# Patient Record
Sex: FEMALE | URBAN - METROPOLITAN AREA
[De-identification: names, ages, dates, MRNs, and addresses within clinical notes are randomized per-mention and may not be internally consistent; named-entity substitution may affect disease eponyms.]

---

## 2021-12-17 ENCOUNTER — PREPPED CHART (OUTPATIENT)
Dept: URBAN - METROPOLITAN AREA CLINIC 74 | Facility: CLINIC | Age: 56
End: 2021-12-17

## 2021-12-17 PROBLEM — H34.8310 BRANCH RETINAL VEIN OCCLUSION WITH MACULAR EDEMA: Noted: 2021-12-17

## 2021-12-17 PROBLEM — H43.813 POSTERIOR VITREOUS DETACHMENT: Noted: 2021-12-17

## 2021-12-17 PROBLEM — H53.002 AMBYOPIA: Noted: 2021-12-17

## 2021-12-17 PROBLEM — H43.811: Noted: 2021-12-17

## 2022-04-23 ASSESSMENT — TONOMETRY
OD_IOP_MMHG: 14
OS_IOP_MMHG: 13

## 2022-04-23 ASSESSMENT — VISUAL ACUITY
OD_PH: 20/25-2
OD_SC: 20/40-2
OS_SC: 20/20-2

## 2022-06-03 ENCOUNTER — FOLLOW UP (OUTPATIENT)
Dept: URBAN - METROPOLITAN AREA CLINIC 74 | Facility: CLINIC | Age: 57
End: 2022-06-03

## 2022-06-03 DIAGNOSIS — H34.8310: ICD-10-CM

## 2022-06-03 DIAGNOSIS — H53.002: ICD-10-CM

## 2022-06-03 DIAGNOSIS — H43.813: ICD-10-CM

## 2022-06-03 PROCEDURE — 99214 OFFICE O/P EST MOD 30 MIN: CPT | Mod: 25

## 2022-06-03 PROCEDURE — 67028 INJECTION EYE DRUG: CPT

## 2022-06-03 PROCEDURE — 92202 OPSCPY EXTND ON/MAC DRAW: CPT

## 2022-06-03 PROCEDURE — 92134 CPTRZ OPH DX IMG PST SGM RTA: CPT

## 2022-06-03 PROCEDURE — PF5 LUCENTIS PREFILLED SYRINGE

## 2022-06-03 ASSESSMENT — VISUAL ACUITY
OD_PH: 20/50-1
OD_CC: 20/60-1
OS_CC: 20/30-1

## 2022-06-03 ASSESSMENT — TONOMETRY
OS_IOP_MMHG: 14
OD_IOP_MMHG: 15

## 2022-07-14 ENCOUNTER — FOLLOW UP (OUTPATIENT)
Dept: URBAN - METROPOLITAN AREA CLINIC 74 | Facility: CLINIC | Age: 57
End: 2022-07-14

## 2022-07-14 DIAGNOSIS — H43.813: ICD-10-CM

## 2022-07-14 DIAGNOSIS — H34.8310: ICD-10-CM

## 2022-07-14 DIAGNOSIS — H53.002: ICD-10-CM

## 2022-07-14 PROCEDURE — PF5 LUCENTIS PREFILLED SYRINGE

## 2022-07-14 PROCEDURE — 92134 CPTRZ OPH DX IMG PST SGM RTA: CPT

## 2022-07-14 PROCEDURE — 67028 INJECTION EYE DRUG: CPT

## 2022-07-14 PROCEDURE — 92014 COMPRE OPH EXAM EST PT 1/>: CPT | Mod: 25

## 2022-07-14 PROCEDURE — 92202 OPSCPY EXTND ON/MAC DRAW: CPT

## 2022-07-14 ASSESSMENT — TONOMETRY
OD_IOP_MMHG: 11
OS_IOP_MMHG: 14

## 2022-07-14 ASSESSMENT — VISUAL ACUITY
OD_CC: 20/50-2
OS_CC: 20/30

## 2022-08-31 ENCOUNTER — FOLLOW UP (OUTPATIENT)
Dept: URBAN - METROPOLITAN AREA CLINIC 74 | Facility: CLINIC | Age: 57
End: 2022-08-31

## 2022-08-31 DIAGNOSIS — H34.8310: ICD-10-CM

## 2022-08-31 DIAGNOSIS — H53.002: ICD-10-CM

## 2022-08-31 DIAGNOSIS — H43.813: ICD-10-CM

## 2022-08-31 PROCEDURE — 92014 COMPRE OPH EXAM EST PT 1/>: CPT | Mod: 25

## 2022-08-31 PROCEDURE — 67028 INJECTION EYE DRUG: CPT

## 2022-08-31 PROCEDURE — 92202 OPSCPY EXTND ON/MAC DRAW: CPT

## 2022-08-31 PROCEDURE — 92134 CPTRZ OPH DX IMG PST SGM RTA: CPT

## 2022-08-31 PROCEDURE — PF5 LUCENTIS PREFILLED SYRINGE

## 2022-08-31 ASSESSMENT — VISUAL ACUITY
OD_CC: 20/70
OD_PH: 20/60
OS_CC: 20/30

## 2022-08-31 ASSESSMENT — TONOMETRY
OS_IOP_MMHG: 11
OD_IOP_MMHG: 14

## 2022-10-11 ENCOUNTER — FOLLOW UP (OUTPATIENT)
Dept: URBAN - METROPOLITAN AREA CLINIC 18 | Facility: CLINIC | Age: 57
End: 2022-10-11

## 2022-10-11 DIAGNOSIS — H53.002: ICD-10-CM

## 2022-10-11 DIAGNOSIS — H34.8310: ICD-10-CM

## 2022-10-11 DIAGNOSIS — H43.813: ICD-10-CM

## 2022-10-11 PROCEDURE — 92202 OPSCPY EXTND ON/MAC DRAW: CPT | Mod: 59

## 2022-10-11 PROCEDURE — 67028 INJECTION EYE DRUG: CPT

## 2022-10-11 PROCEDURE — 92134 CPTRZ OPH DX IMG PST SGM RTA: CPT

## 2022-10-11 PROCEDURE — PF5 LUCENTIS PREFILLED SYRINGE

## 2022-10-11 PROCEDURE — 92014 COMPRE OPH EXAM EST PT 1/>: CPT | Mod: 25

## 2022-10-11 ASSESSMENT — TONOMETRY
OS_IOP_MMHG: 14
OD_IOP_MMHG: 16

## 2022-10-11 ASSESSMENT — VISUAL ACUITY
OS_SC: 20/30
OD_SC: 20/50-1

## 2022-11-16 ENCOUNTER — FOLLOW UP (OUTPATIENT)
Dept: URBAN - METROPOLITAN AREA CLINIC 74 | Facility: CLINIC | Age: 57
End: 2022-11-16

## 2022-11-16 DIAGNOSIS — H34.8310: ICD-10-CM

## 2022-11-16 DIAGNOSIS — H53.002: ICD-10-CM

## 2022-11-16 DIAGNOSIS — H43.813: ICD-10-CM

## 2022-11-16 PROCEDURE — 92202 OPSCPY EXTND ON/MAC DRAW: CPT | Mod: NC

## 2022-11-16 PROCEDURE — 67028 INJECTION EYE DRUG: CPT

## 2022-11-16 PROCEDURE — 92134 CPTRZ OPH DX IMG PST SGM RTA: CPT

## 2022-11-16 PROCEDURE — PF5 LUCENTIS PREFILLED SYRINGE

## 2022-11-16 PROCEDURE — 92014 COMPRE OPH EXAM EST PT 1/>: CPT | Mod: 25

## 2022-11-16 ASSESSMENT — TONOMETRY
OS_IOP_MMHG: 15
OD_IOP_MMHG: 13

## 2022-11-16 ASSESSMENT — VISUAL ACUITY
OS_CC: 20/30-2
OD_CC: 20/50+2

## 2022-12-19 ENCOUNTER — FOLLOW UP (OUTPATIENT)
Dept: URBAN - METROPOLITAN AREA CLINIC 18 | Facility: CLINIC | Age: 57
End: 2022-12-19

## 2022-12-19 DIAGNOSIS — H34.8310: ICD-10-CM

## 2022-12-19 DIAGNOSIS — H43.813: ICD-10-CM

## 2022-12-19 DIAGNOSIS — H53.002: ICD-10-CM

## 2022-12-19 PROCEDURE — 92202 OPSCPY EXTND ON/MAC DRAW: CPT | Mod: NC

## 2022-12-19 PROCEDURE — PF5 LUCENTIS PREFILLED SYRINGE

## 2022-12-19 PROCEDURE — 92014 COMPRE OPH EXAM EST PT 1/>: CPT | Mod: 25

## 2022-12-19 PROCEDURE — 92134 CPTRZ OPH DX IMG PST SGM RTA: CPT

## 2022-12-19 PROCEDURE — 67028 INJECTION EYE DRUG: CPT

## 2022-12-19 ASSESSMENT — VISUAL ACUITY
OD_CC: 20/40-2
OS_CC: 20/30
OD_PH: 20/30-1
OS_PH: 20/25-2

## 2022-12-19 ASSESSMENT — TONOMETRY
OD_IOP_MMHG: 14
OS_IOP_MMHG: 18

## 2023-01-26 ENCOUNTER — FOLLOW UP (OUTPATIENT)
Dept: URBAN - METROPOLITAN AREA CLINIC 74 | Facility: CLINIC | Age: 58
End: 2023-01-26

## 2023-01-26 DIAGNOSIS — H43.813: ICD-10-CM

## 2023-01-26 DIAGNOSIS — H53.002: ICD-10-CM

## 2023-01-26 DIAGNOSIS — H34.8310: ICD-10-CM

## 2023-01-26 PROCEDURE — 92014 COMPRE OPH EXAM EST PT 1/>: CPT | Mod: 25

## 2023-01-26 PROCEDURE — 67028 INJECTION EYE DRUG: CPT

## 2023-01-26 PROCEDURE — 92134 CPTRZ OPH DX IMG PST SGM RTA: CPT

## 2023-01-26 PROCEDURE — PF5 LUCENTIS PREFILLED SYRINGE

## 2023-01-26 PROCEDURE — 92202 OPSCPY EXTND ON/MAC DRAW: CPT | Mod: NC

## 2023-01-26 ASSESSMENT — VISUAL ACUITY
OD_PH: 20/60
OS_CC: 20/30-2
OD_CC: 20/70+2

## 2023-01-26 ASSESSMENT — TONOMETRY
OD_IOP_MMHG: 15
OS_IOP_MMHG: 18

## 2023-03-01 ENCOUNTER — PROBLEM (OUTPATIENT)
Dept: URBAN - METROPOLITAN AREA CLINIC 74 | Facility: CLINIC | Age: 58
End: 2023-03-01

## 2023-03-01 DIAGNOSIS — H43.813: ICD-10-CM

## 2023-03-01 DIAGNOSIS — J30.1: ICD-10-CM

## 2023-03-01 DIAGNOSIS — H34.8310: ICD-10-CM

## 2023-03-01 DIAGNOSIS — H10.13: ICD-10-CM

## 2023-03-01 DIAGNOSIS — H53.002: ICD-10-CM

## 2023-03-01 PROCEDURE — 92012 INTRM OPH EXAM EST PATIENT: CPT

## 2023-03-01 PROCEDURE — 92134 CPTRZ OPH DX IMG PST SGM RTA: CPT

## 2023-03-01 ASSESSMENT — VISUAL ACUITY
OS_SC: 20/30
OD_SC: 20/50

## 2023-03-15 ENCOUNTER — PROBLEM (OUTPATIENT)
Dept: URBAN - METROPOLITAN AREA CLINIC 74 | Facility: CLINIC | Age: 58
End: 2023-03-15

## 2023-03-15 DIAGNOSIS — H53.002: ICD-10-CM

## 2023-03-15 DIAGNOSIS — H34.8310: ICD-10-CM

## 2023-03-15 DIAGNOSIS — H43.813: ICD-10-CM

## 2023-03-15 PROCEDURE — PF5 LUCENTIS PREFILLED SYRINGE

## 2023-03-15 PROCEDURE — 92134 CPTRZ OPH DX IMG PST SGM RTA: CPT

## 2023-03-15 PROCEDURE — 92014 COMPRE OPH EXAM EST PT 1/>: CPT | Mod: 25

## 2023-03-15 PROCEDURE — 92202 OPSCPY EXTND ON/MAC DRAW: CPT | Mod: NC

## 2023-03-15 PROCEDURE — 67028 INJECTION EYE DRUG: CPT

## 2023-03-15 ASSESSMENT — VISUAL ACUITY
OS_SC: 20/40+2
OD_PH: 20/50
OD_SC: 20/60-2

## 2023-03-15 ASSESSMENT — TONOMETRY
OD_IOP_MMHG: 14
OS_IOP_MMHG: 16

## 2023-04-26 ENCOUNTER — FOLLOW UP (OUTPATIENT)
Dept: URBAN - METROPOLITAN AREA CLINIC 74 | Facility: CLINIC | Age: 58
End: 2023-04-26

## 2023-04-26 DIAGNOSIS — H34.8310: ICD-10-CM

## 2023-04-26 DIAGNOSIS — H43.813: ICD-10-CM

## 2023-04-26 DIAGNOSIS — H53.002: ICD-10-CM

## 2023-04-26 PROCEDURE — 92014 COMPRE OPH EXAM EST PT 1/>: CPT | Mod: 25

## 2023-04-26 PROCEDURE — PF5 LUCENTIS PREFILLED SYRINGE

## 2023-04-26 PROCEDURE — 92201 OPSCPY EXTND RTA DRAW UNI/BI: CPT | Mod: 59

## 2023-04-26 PROCEDURE — 67028 INJECTION EYE DRUG: CPT

## 2023-04-26 PROCEDURE — 92134 CPTRZ OPH DX IMG PST SGM RTA: CPT

## 2023-04-26 ASSESSMENT — VISUAL ACUITY
OS_PH: 20/25
OD_SC: 20/50
OD_PH: 20/40-2
OS_SC: 20/30

## 2023-04-26 ASSESSMENT — TONOMETRY
OS_IOP_MMHG: 15
OD_IOP_MMHG: 14

## 2023-06-06 ENCOUNTER — FOLLOW UP (OUTPATIENT)
Dept: URBAN - METROPOLITAN AREA CLINIC 18 | Facility: CLINIC | Age: 58
End: 2023-06-06

## 2023-06-06 DIAGNOSIS — H34.8310: ICD-10-CM

## 2023-06-06 DIAGNOSIS — H53.002: ICD-10-CM

## 2023-06-06 DIAGNOSIS — H43.813: ICD-10-CM

## 2023-06-06 PROCEDURE — 67028 INJECTION EYE DRUG: CPT

## 2023-06-06 PROCEDURE — 92014 COMPRE OPH EXAM EST PT 1/>: CPT | Mod: 25

## 2023-06-06 PROCEDURE — 92202 OPSCPY EXTND ON/MAC DRAW: CPT | Mod: NC

## 2023-06-06 PROCEDURE — PFS EYLEA PFS

## 2023-06-06 PROCEDURE — 92134 CPTRZ OPH DX IMG PST SGM RTA: CPT

## 2023-06-06 ASSESSMENT — VISUAL ACUITY
OS_CC: 20/30-1
OD_CC: 20/40

## 2023-06-06 ASSESSMENT — TONOMETRY
OS_IOP_MMHG: 11
OD_IOP_MMHG: 8

## 2023-06-28 ENCOUNTER — PROBLEM (OUTPATIENT)
Dept: URBAN - METROPOLITAN AREA CLINIC 74 | Facility: CLINIC | Age: 58
End: 2023-06-28

## 2023-06-28 DIAGNOSIS — H53.002: ICD-10-CM

## 2023-06-28 DIAGNOSIS — H43.811: ICD-10-CM

## 2023-06-28 DIAGNOSIS — H34.8310: ICD-10-CM

## 2023-06-28 PROCEDURE — 92134 CPTRZ OPH DX IMG PST SGM RTA: CPT

## 2023-06-28 PROCEDURE — 92201 OPSCPY EXTND RTA DRAW UNI/BI: CPT

## 2023-06-28 PROCEDURE — 92014 COMPRE OPH EXAM EST PT 1/>: CPT

## 2023-06-28 ASSESSMENT — TONOMETRY
OS_IOP_MMHG: 12
OD_IOP_MMHG: 15

## 2023-06-28 ASSESSMENT — VISUAL ACUITY
OD_CC: 20/40-2
OS_CC: 20/30

## 2023-07-06 ENCOUNTER — FOLLOW UP (OUTPATIENT)
Dept: URBAN - METROPOLITAN AREA CLINIC 74 | Facility: CLINIC | Age: 58
End: 2023-07-06

## 2023-07-06 DIAGNOSIS — H43.813: ICD-10-CM

## 2023-07-06 DIAGNOSIS — H34.8310: ICD-10-CM

## 2023-07-06 PROCEDURE — 67028 INJECTION EYE DRUG: CPT

## 2023-07-06 PROCEDURE — 92134 CPTRZ OPH DX IMG PST SGM RTA: CPT

## 2023-07-06 PROCEDURE — 92202 OPSCPY EXTND ON/MAC DRAW: CPT | Mod: 59

## 2023-07-06 PROCEDURE — 92014 COMPRE OPH EXAM EST PT 1/>: CPT | Mod: 25

## 2023-07-06 PROCEDURE — PFS EYLEA PFS: Mod: JZ

## 2023-07-06 ASSESSMENT — VISUAL ACUITY
OD_SC: 20/30-2
OS_SC: 20/20

## 2023-07-06 ASSESSMENT — TONOMETRY
OD_IOP_MMHG: 6
OS_IOP_MMHG: 7

## 2023-08-10 ENCOUNTER — FOLLOW UP (OUTPATIENT)
Dept: URBAN - METROPOLITAN AREA CLINIC 74 | Facility: CLINIC | Age: 58
End: 2023-08-10

## 2023-08-10 DIAGNOSIS — H33.321: ICD-10-CM

## 2023-08-10 DIAGNOSIS — H34.8310: ICD-10-CM

## 2023-08-10 PROCEDURE — 92134 CPTRZ OPH DX IMG PST SGM RTA: CPT

## 2023-08-10 PROCEDURE — 92201 OPSCPY EXTND RTA DRAW UNI/BI: CPT

## 2023-08-10 PROCEDURE — 92014 COMPRE OPH EXAM EST PT 1/>: CPT

## 2023-08-10 ASSESSMENT — TONOMETRY
OS_IOP_MMHG: 11
OD_IOP_MMHG: 10

## 2023-08-10 ASSESSMENT — VISUAL ACUITY
OS_SC: 20/20
OD_SC: 20/30-3

## 2023-08-30 ENCOUNTER — FOLLOW UP (OUTPATIENT)
Dept: URBAN - METROPOLITAN AREA CLINIC 74 | Facility: CLINIC | Age: 58
End: 2023-08-30

## 2023-08-30 DIAGNOSIS — H35.412: ICD-10-CM

## 2023-08-30 DIAGNOSIS — H33.321: ICD-10-CM

## 2023-08-30 DIAGNOSIS — H43.813: ICD-10-CM

## 2023-08-30 DIAGNOSIS — H34.8310: ICD-10-CM

## 2023-08-30 PROCEDURE — 92201 OPSCPY EXTND RTA DRAW UNI/BI: CPT | Mod: 59

## 2023-08-30 PROCEDURE — 92134 CPTRZ OPH DX IMG PST SGM RTA: CPT

## 2023-08-30 PROCEDURE — 67028 INJECTION EYE DRUG: CPT

## 2023-08-30 PROCEDURE — PFS EYLEA PFS: Mod: JZ

## 2023-08-30 PROCEDURE — 92014 COMPRE OPH EXAM EST PT 1/>: CPT | Mod: 25

## 2023-08-30 ASSESSMENT — TONOMETRY
OS_IOP_MMHG: 13
OD_IOP_MMHG: 14

## 2023-08-30 ASSESSMENT — VISUAL ACUITY
OD_SC: 20/30+2
OD_PH: 20/25-
OS_SC: 20/20-2

## 2023-10-25 ENCOUNTER — FOLLOW UP (OUTPATIENT)
Dept: URBAN - METROPOLITAN AREA CLINIC 74 | Facility: CLINIC | Age: 58
End: 2023-10-25

## 2023-10-25 DIAGNOSIS — H43.813: ICD-10-CM

## 2023-10-25 DIAGNOSIS — H33.321: ICD-10-CM

## 2023-10-25 DIAGNOSIS — H35.412: ICD-10-CM

## 2023-10-25 DIAGNOSIS — H34.8310: ICD-10-CM

## 2023-10-25 PROCEDURE — 67028 INJECTION EYE DRUG: CPT

## 2023-10-25 PROCEDURE — PFS EYLEA PFS: Mod: JZ

## 2023-10-25 PROCEDURE — 92201 OPSCPY EXTND RTA DRAW UNI/BI: CPT | Mod: 59

## 2023-10-25 PROCEDURE — 92134 CPTRZ OPH DX IMG PST SGM RTA: CPT

## 2023-10-25 PROCEDURE — 92014 COMPRE OPH EXAM EST PT 1/>: CPT | Mod: 25

## 2023-10-25 ASSESSMENT — TONOMETRY
OD_IOP_MMHG: 21
OS_IOP_MMHG: 16

## 2023-10-25 ASSESSMENT — VISUAL ACUITY
OD_PH: 20/25-1
OD_CC: 20/30-1
OS_CC: 20/25+2

## 2024-01-17 ENCOUNTER — FOLLOW UP (OUTPATIENT)
Dept: URBAN - METROPOLITAN AREA CLINIC 74 | Facility: CLINIC | Age: 59
End: 2024-01-17

## 2024-01-17 DIAGNOSIS — H34.8310: ICD-10-CM

## 2024-01-17 DIAGNOSIS — H33.321: ICD-10-CM

## 2024-01-17 DIAGNOSIS — H43.813: ICD-10-CM

## 2024-01-17 DIAGNOSIS — H35.412: ICD-10-CM

## 2024-01-17 PROCEDURE — PFS EYLEA PFS: Mod: JZ

## 2024-01-17 PROCEDURE — 92014 COMPRE OPH EXAM EST PT 1/>: CPT | Mod: 25

## 2024-01-17 PROCEDURE — 67028 INJECTION EYE DRUG: CPT

## 2024-01-17 PROCEDURE — 92134 CPTRZ OPH DX IMG PST SGM RTA: CPT

## 2024-01-17 PROCEDURE — 92201 OPSCPY EXTND RTA DRAW UNI/BI: CPT | Mod: 59

## 2024-01-17 ASSESSMENT — VISUAL ACUITY
OD_PH: 20/40
OS_SC: 20/25+1
OD_SC: 20/50-2

## 2024-01-17 ASSESSMENT — TONOMETRY
OS_IOP_MMHG: 13
OD_IOP_MMHG: 14

## 2024-03-20 ENCOUNTER — FOLLOW UP (OUTPATIENT)
Dept: URBAN - METROPOLITAN AREA CLINIC 74 | Facility: CLINIC | Age: 59
End: 2024-03-20

## 2024-03-20 DIAGNOSIS — H33.321: ICD-10-CM

## 2024-03-20 DIAGNOSIS — H34.8310: ICD-10-CM

## 2024-03-20 DIAGNOSIS — H35.412: ICD-10-CM

## 2024-03-20 DIAGNOSIS — H43.813: ICD-10-CM

## 2024-03-20 PROCEDURE — 92134 CPTRZ OPH DX IMG PST SGM RTA: CPT

## 2024-03-20 PROCEDURE — 92202 OPSCPY EXTND ON/MAC DRAW: CPT | Mod: 59

## 2024-03-20 PROCEDURE — 92014 COMPRE OPH EXAM EST PT 1/>: CPT | Mod: 25

## 2024-03-20 PROCEDURE — 67028 INJECTION EYE DRUG: CPT

## 2024-03-20 PROCEDURE — PFS EYLEA PFS: Mod: JZ

## 2024-03-20 ASSESSMENT — TONOMETRY
OD_IOP_MMHG: 15
OS_IOP_MMHG: 16

## 2024-03-20 ASSESSMENT — VISUAL ACUITY
OD_SC: 20/40-2
OS_SC: 20/25
OD_PH: 20/25-2

## 2024-05-14 ENCOUNTER — APPOINTMENT (RX ONLY)
Dept: URBAN - METROPOLITAN AREA CLINIC 4 | Facility: CLINIC | Age: 59
Setting detail: DERMATOLOGY
End: 2024-05-14

## 2024-05-14 DIAGNOSIS — L91.8 OTHER HYPERTROPHIC DISORDERS OF THE SKIN: ICD-10-CM

## 2024-05-14 DIAGNOSIS — L71.8 OTHER ROSACEA: ICD-10-CM

## 2024-05-14 DIAGNOSIS — L81.4 OTHER MELANIN HYPERPIGMENTATION: ICD-10-CM

## 2024-05-14 DIAGNOSIS — D22 MELANOCYTIC NEVI: ICD-10-CM

## 2024-05-14 DIAGNOSIS — L82.1 OTHER SEBORRHEIC KERATOSIS: ICD-10-CM

## 2024-05-14 DIAGNOSIS — B37.2 CANDIDIASIS OF SKIN AND NAIL: ICD-10-CM

## 2024-05-14 DIAGNOSIS — L85.8 OTHER SPECIFIED EPIDERMAL THICKENING: ICD-10-CM

## 2024-05-14 PROBLEM — D22.62 MELANOCYTIC NEVI OF LEFT UPPER LIMB, INCLUDING SHOULDER: Status: ACTIVE | Noted: 2024-05-14

## 2024-05-14 PROBLEM — D22.72 MELANOCYTIC NEVI OF LEFT LOWER LIMB, INCLUDING HIP: Status: ACTIVE | Noted: 2024-05-14

## 2024-05-14 PROBLEM — D22.5 MELANOCYTIC NEVI OF TRUNK: Status: ACTIVE | Noted: 2024-05-14

## 2024-05-14 PROBLEM — D22.71 MELANOCYTIC NEVI OF RIGHT LOWER LIMB, INCLUDING HIP: Status: ACTIVE | Noted: 2024-05-14

## 2024-05-14 PROBLEM — D22.61 MELANOCYTIC NEVI OF RIGHT UPPER LIMB, INCLUDING SHOULDER: Status: ACTIVE | Noted: 2024-05-14

## 2024-05-14 PROBLEM — D22.4 MELANOCYTIC NEVI OF SCALP AND NECK: Status: ACTIVE | Noted: 2024-05-14

## 2024-05-14 PROCEDURE — ? COUNSELING

## 2024-05-14 PROCEDURE — ? ADDITIONAL NOTES

## 2024-05-14 PROCEDURE — ? DIAGNOSIS COMMENT

## 2024-05-14 PROCEDURE — 99203 OFFICE O/P NEW LOW 30 MIN: CPT

## 2024-05-14 PROCEDURE — ? OTC TREATMENT REGIMEN

## 2024-05-14 PROCEDURE — ? PRESCRIPTION

## 2024-05-14 PROCEDURE — ? TREATMENT REGIMEN

## 2024-05-14 PROCEDURE — ? POINTED OUT BY PATIENT

## 2024-05-14 RX ORDER — AZELAIC ACID 0.15 G/G
GEL TOPICAL
Qty: 50 | Refills: 3 | Status: ERX | COMMUNITY
Start: 2024-05-14

## 2024-05-14 RX ADMIN — AZELAIC ACID: 0.15 GEL TOPICAL at 00:00

## 2024-05-14 ASSESSMENT — LOCATION SIMPLE DESCRIPTION DERM
LOCATION SIMPLE: LEFT THIGH
LOCATION SIMPLE: LEFT CHEEK
LOCATION SIMPLE: LEFT UPPER ARM
LOCATION SIMPLE: RIGHT FOOT
LOCATION SIMPLE: RIGHT LOWER BACK
LOCATION SIMPLE: RIGHT PRETIBIAL REGION
LOCATION SIMPLE: RIGHT UPPER ARM
LOCATION SIMPLE: LEFT FOREARM
LOCATION SIMPLE: LEFT FOOT
LOCATION SIMPLE: LEFT LATERAL INFERIOR PRETARSAL REGION
LOCATION SIMPLE: RIGHT FOREARM
LOCATION SIMPLE: ABDOMEN
LOCATION SIMPLE: RIGHT CHEEK
LOCATION SIMPLE: SCALP
LOCATION SIMPLE: CHEST
LOCATION SIMPLE: LEFT BREAST
LOCATION SIMPLE: RIGHT THIGH
LOCATION SIMPLE: LEFT PRETIBIAL REGION
LOCATION SIMPLE: GROIN

## 2024-05-14 ASSESSMENT — LOCATION DETAILED DESCRIPTION DERM
LOCATION DETAILED: RIGHT MEDIAL SUPERIOR CHEST
LOCATION DETAILED: RIGHT SUPRAPUBIC SKIN
LOCATION DETAILED: LEFT MEDIAL BREAST 6-7:00 REGION
LOCATION DETAILED: EPIGASTRIC SKIN
LOCATION DETAILED: LEFT LATERAL INFERIOR PRETARSAL REGION
LOCATION DETAILED: LEFT LATERAL HEEL
LOCATION DETAILED: LEFT INFERIOR MEDIAL MALAR CHEEK
LOCATION DETAILED: LEFT ANTERIOR PROXIMAL THIGH
LOCATION DETAILED: RIGHT ANTERIOR DISTAL UPPER ARM
LOCATION DETAILED: RIGHT ANTERIOR MEDIAL PROXIMAL THIGH
LOCATION DETAILED: RIGHT ANTERIOR PROXIMAL THIGH
LOCATION DETAILED: LEFT INFERIOR CENTRAL MALAR CHEEK
LOCATION DETAILED: RIGHT ANTERIOR PROXIMAL UPPER ARM
LOCATION DETAILED: LEFT VENTRAL PROXIMAL FOREARM
LOCATION DETAILED: RIGHT ANTERIOR DISTAL THIGH
LOCATION DETAILED: LEFT ANTERIOR DISTAL UPPER ARM
LOCATION DETAILED: LEFT ANTERIOR LATERAL DISTAL THIGH
LOCATION DETAILED: RIGHT LATERAL HEEL
LOCATION DETAILED: LEFT PROXIMAL PRETIBIAL REGION
LOCATION DETAILED: LEFT CENTRAL PARIETAL SCALP
LOCATION DETAILED: RIGHT SUPERIOR MEDIAL MIDBACK
LOCATION DETAILED: RIGHT CENTRAL MALAR CHEEK
LOCATION DETAILED: RIGHT PROXIMAL PRETIBIAL REGION
LOCATION DETAILED: RIGHT VENTRAL PROXIMAL FOREARM
LOCATION DETAILED: LEFT ANTERIOR PROXIMAL UPPER ARM
LOCATION DETAILED: RIGHT LATERAL ABDOMEN

## 2024-05-14 ASSESSMENT — LOCATION ZONE DERM
LOCATION ZONE: FACE
LOCATION ZONE: LEG
LOCATION ZONE: EYELID
LOCATION ZONE: ARM
LOCATION ZONE: TRUNK
LOCATION ZONE: FEET
LOCATION ZONE: SCALP

## 2024-05-14 NOTE — PROCEDURE: ADDITIONAL NOTES
Render Risk Assessment In Note?: no
Detail Level: Simple
Additional Notes: Informed pt if she would like removal it would be cosmetic and needs to be a seperate appt

## 2024-05-14 NOTE — HPI: EVALUATION OF SKIN LESION(S)
What Type Of Note Output Would You Prefer (Optional)?: Bullet Format
Hpi Title: Evaluation of Skin Lesions
Additional History: \\nReports intermittent and recurrent red rash on lower abdomen and inframammary creases. Resolves with clotrimazole prn.

## 2024-05-14 NOTE — PROCEDURE: DIAGNOSIS COMMENT
Render Risk Assessment In Note?: no
Detail Level: Simple
Comment: Advised pt to f/up with  for removal

## 2024-05-14 NOTE — PROCEDURE: OTC TREATMENT REGIMEN
Patient Specific Otc Recommendations (Will Not Stick From Patient To Patient): Clotrimazole cream BID for 2 weeks prn
Detail Level: Zone

## 2024-06-19 ENCOUNTER — FOLLOW UP (OUTPATIENT)
Dept: URBAN - METROPOLITAN AREA CLINIC 74 | Facility: CLINIC | Age: 59
End: 2024-06-19

## 2024-06-19 DIAGNOSIS — H35.412: ICD-10-CM

## 2024-06-19 DIAGNOSIS — H33.321: ICD-10-CM

## 2024-06-19 DIAGNOSIS — H43.813: ICD-10-CM

## 2024-06-19 DIAGNOSIS — H34.8310: ICD-10-CM

## 2024-06-19 PROCEDURE — 92014 COMPRE OPH EXAM EST PT 1/>: CPT | Mod: 25

## 2024-06-19 PROCEDURE — PFS EYLEA PFS: Mod: JZ

## 2024-06-19 PROCEDURE — 92202 OPSCPY EXTND ON/MAC DRAW: CPT | Mod: 59

## 2024-06-19 PROCEDURE — 67028 INJECTION EYE DRUG: CPT

## 2024-06-19 PROCEDURE — 92134 CPTRZ OPH DX IMG PST SGM RTA: CPT

## 2024-06-19 ASSESSMENT — TONOMETRY
OD_IOP_MMHG: 15
OS_IOP_MMHG: 17

## 2024-06-19 ASSESSMENT — VISUAL ACUITY
OD_SC: 20/40-2
OS_SC: 20/20-

## 2024-08-28 ENCOUNTER — FOLLOW UP (OUTPATIENT)
Dept: URBAN - METROPOLITAN AREA CLINIC 74 | Facility: CLINIC | Age: 59
End: 2024-08-28

## 2024-08-28 DIAGNOSIS — H33.321: ICD-10-CM

## 2024-08-28 DIAGNOSIS — H35.412: ICD-10-CM

## 2024-08-28 DIAGNOSIS — H43.813: ICD-10-CM

## 2024-08-28 DIAGNOSIS — H34.8310: ICD-10-CM

## 2024-08-28 PROCEDURE — 92202 OPSCPY EXTND ON/MAC DRAW: CPT | Mod: 59

## 2024-08-28 PROCEDURE — 92014 COMPRE OPH EXAM EST PT 1/>: CPT | Mod: 25

## 2024-08-28 PROCEDURE — PFS EYLEA PFS: Mod: JZ

## 2024-08-28 PROCEDURE — 67028 INJECTION EYE DRUG: CPT

## 2024-08-28 PROCEDURE — 92134 CPTRZ OPH DX IMG PST SGM RTA: CPT

## 2024-08-28 ASSESSMENT — VISUAL ACUITY
OD_SC: 20/40-1
OS_SC: 20/20

## 2024-08-28 ASSESSMENT — TONOMETRY
OD_IOP_MMHG: 9
OS_IOP_MMHG: 9

## 2024-10-16 ENCOUNTER — FOLLOW UP (OUTPATIENT)
Dept: URBAN - METROPOLITAN AREA CLINIC 74 | Facility: CLINIC | Age: 59
End: 2024-10-16

## 2024-10-16 DIAGNOSIS — H33.321: ICD-10-CM

## 2024-10-16 DIAGNOSIS — H34.8310: ICD-10-CM

## 2024-10-16 DIAGNOSIS — H43.813: ICD-10-CM

## 2024-10-16 DIAGNOSIS — H35.412: ICD-10-CM

## 2024-10-16 PROCEDURE — 92134 CPTRZ OPH DX IMG PST SGM RTA: CPT

## 2024-10-16 PROCEDURE — 92014 COMPRE OPH EXAM EST PT 1/>: CPT | Mod: 25

## 2024-10-16 PROCEDURE — 67028 INJECTION EYE DRUG: CPT

## 2024-10-16 PROCEDURE — 92202 OPSCPY EXTND ON/MAC DRAW: CPT | Mod: 59

## 2024-10-16 PROCEDURE — PFS EYLEA PFS: Mod: JZ

## 2024-10-16 ASSESSMENT — TONOMETRY
OD_IOP_MMHG: 12
OS_IOP_MMHG: 11

## 2024-10-16 ASSESSMENT — VISUAL ACUITY
OS_SC: 20/20-2
OD_SC: 20/30+2

## 2024-11-20 ENCOUNTER — PROBLEM (OUTPATIENT)
Dept: URBAN - METROPOLITAN AREA CLINIC 74 | Facility: CLINIC | Age: 59
End: 2024-11-20

## 2024-11-20 DIAGNOSIS — H35.62: ICD-10-CM

## 2024-11-20 DIAGNOSIS — H43.813: ICD-10-CM

## 2024-11-20 DIAGNOSIS — H53.8: ICD-10-CM

## 2024-11-20 DIAGNOSIS — H33.321: ICD-10-CM

## 2024-11-20 DIAGNOSIS — H34.8310: ICD-10-CM

## 2024-11-20 DIAGNOSIS — H25.13: ICD-10-CM

## 2024-11-20 DIAGNOSIS — H35.412: ICD-10-CM

## 2024-11-20 PROCEDURE — 92202 OPSCPY EXTND ON/MAC DRAW: CPT

## 2024-11-20 PROCEDURE — 92134 CPTRZ OPH DX IMG PST SGM RTA: CPT

## 2024-11-20 PROCEDURE — 92014 COMPRE OPH EXAM EST PT 1/>: CPT

## 2024-11-20 ASSESSMENT — VISUAL ACUITY
OD_PH: 20/30-2
OS_SC: 20/25-2
OD_SC: 20/40-1

## 2024-11-20 ASSESSMENT — TONOMETRY
OD_IOP_MMHG: 15
OS_IOP_MMHG: 13

## 2025-01-15 ENCOUNTER — FOLLOW UP (OUTPATIENT)
Dept: URBAN - METROPOLITAN AREA CLINIC 74 | Facility: CLINIC | Age: 60
End: 2025-01-15

## 2025-01-15 DIAGNOSIS — H43.813: ICD-10-CM

## 2025-01-15 DIAGNOSIS — H35.412: ICD-10-CM

## 2025-01-15 DIAGNOSIS — H35.62: ICD-10-CM

## 2025-01-15 DIAGNOSIS — H33.321: ICD-10-CM

## 2025-01-15 DIAGNOSIS — H34.8310: ICD-10-CM

## 2025-01-15 PROCEDURE — 92014 COMPRE OPH EXAM EST PT 1/>: CPT | Mod: 25

## 2025-01-15 PROCEDURE — 67028 INJECTION EYE DRUG: CPT

## 2025-01-15 PROCEDURE — PFS EYLEA PFS: Mod: JZ

## 2025-01-15 PROCEDURE — 92202 OPSCPY EXTND ON/MAC DRAW: CPT | Mod: 59

## 2025-01-15 PROCEDURE — 92134 CPTRZ OPH DX IMG PST SGM RTA: CPT

## 2025-01-15 ASSESSMENT — VISUAL ACUITY
OD_PH: 20/40-2
OS_CC: 20/25-1
OD_CC: 20/50

## 2025-01-15 ASSESSMENT — TONOMETRY
OS_IOP_MMHG: 14
OD_IOP_MMHG: 15

## 2025-03-13 ENCOUNTER — FOLLOW UP (OUTPATIENT)
Dept: URBAN - METROPOLITAN AREA CLINIC 74 | Facility: CLINIC | Age: 60
End: 2025-03-13

## 2025-03-13 DIAGNOSIS — H33.321: ICD-10-CM

## 2025-03-13 DIAGNOSIS — H34.8310: ICD-10-CM

## 2025-03-13 DIAGNOSIS — H35.62: ICD-10-CM

## 2025-03-13 DIAGNOSIS — H43.813: ICD-10-CM

## 2025-03-13 DIAGNOSIS — H35.412: ICD-10-CM

## 2025-03-13 PROCEDURE — PFS EYLEA PFS: Mod: JZ

## 2025-03-13 PROCEDURE — 92202 OPSCPY EXTND ON/MAC DRAW: CPT | Mod: 59

## 2025-03-13 PROCEDURE — 92134 CPTRZ OPH DX IMG PST SGM RTA: CPT

## 2025-03-13 PROCEDURE — 67028 INJECTION EYE DRUG: CPT

## 2025-03-13 PROCEDURE — 92014 COMPRE OPH EXAM EST PT 1/>: CPT | Mod: 25

## 2025-03-13 ASSESSMENT — TONOMETRY
OS_IOP_MMHG: 10
OD_IOP_MMHG: 11

## 2025-03-13 ASSESSMENT — VISUAL ACUITY
OD_CC: 20/40
OS_CC: 20/25

## 2025-05-07 ENCOUNTER — FOLLOW UP (OUTPATIENT)
Dept: URBAN - METROPOLITAN AREA CLINIC 74 | Facility: CLINIC | Age: 60
End: 2025-05-07

## 2025-05-07 DIAGNOSIS — H35.62: ICD-10-CM

## 2025-05-07 DIAGNOSIS — H34.8310: ICD-10-CM

## 2025-05-07 DIAGNOSIS — H43.813: ICD-10-CM

## 2025-05-07 DIAGNOSIS — H33.321: ICD-10-CM

## 2025-05-07 DIAGNOSIS — H35.412: ICD-10-CM

## 2025-05-07 PROCEDURE — PFS EYLEA PFS: Mod: JZ

## 2025-05-07 PROCEDURE — 92201 OPSCPY EXTND RTA DRAW UNI/BI: CPT | Mod: 59

## 2025-05-07 PROCEDURE — 92014 COMPRE OPH EXAM EST PT 1/>: CPT | Mod: 25

## 2025-05-07 PROCEDURE — 92134 CPTRZ OPH DX IMG PST SGM RTA: CPT

## 2025-05-07 PROCEDURE — 67028 INJECTION EYE DRUG: CPT

## 2025-05-07 ASSESSMENT — VISUAL ACUITY
OD_PH: 20/50
OD_CC: 20/60-2
OS_CC: 20/25

## 2025-05-07 ASSESSMENT — TONOMETRY
OD_IOP_MMHG: 12
OS_IOP_MMHG: 16

## 2025-05-15 ENCOUNTER — APPOINTMENT (OUTPATIENT)
Dept: URBAN - METROPOLITAN AREA CLINIC 4 | Facility: CLINIC | Age: 60
Setting detail: DERMATOLOGY
End: 2025-05-15

## 2025-05-15 DIAGNOSIS — D22 MELANOCYTIC NEVI: ICD-10-CM

## 2025-05-15 DIAGNOSIS — L81.4 OTHER MELANIN HYPERPIGMENTATION: ICD-10-CM

## 2025-05-15 DIAGNOSIS — B35.3 TINEA PEDIS: ICD-10-CM

## 2025-05-15 DIAGNOSIS — B37.2 CANDIDIASIS OF SKIN AND NAIL: ICD-10-CM

## 2025-05-15 DIAGNOSIS — L82.1 OTHER SEBORRHEIC KERATOSIS: ICD-10-CM

## 2025-05-15 DIAGNOSIS — L71.8 OTHER ROSACEA: ICD-10-CM

## 2025-05-15 PROBLEM — D22.71 MELANOCYTIC NEVI OF RIGHT LOWER LIMB, INCLUDING HIP: Status: ACTIVE | Noted: 2025-05-15

## 2025-05-15 PROBLEM — D22.72 MELANOCYTIC NEVI OF LEFT LOWER LIMB, INCLUDING HIP: Status: ACTIVE | Noted: 2025-05-15

## 2025-05-15 PROBLEM — D22.62 MELANOCYTIC NEVI OF LEFT UPPER LIMB, INCLUDING SHOULDER: Status: ACTIVE | Noted: 2025-05-15

## 2025-05-15 PROBLEM — D22.4 MELANOCYTIC NEVI OF SCALP AND NECK: Status: ACTIVE | Noted: 2025-05-15

## 2025-05-15 PROBLEM — D22.61 MELANOCYTIC NEVI OF RIGHT UPPER LIMB, INCLUDING SHOULDER: Status: ACTIVE | Noted: 2025-05-15

## 2025-05-15 PROBLEM — D22.5 MELANOCYTIC NEVI OF TRUNK: Status: ACTIVE | Noted: 2025-05-15

## 2025-05-15 PROCEDURE — ? COUNSELING

## 2025-05-15 PROCEDURE — ? DIAGNOSIS COMMENT

## 2025-05-15 PROCEDURE — ? PRESCRIPTION MEDICATION MANAGEMENT

## 2025-05-15 PROCEDURE — 99214 OFFICE O/P EST MOD 30 MIN: CPT

## 2025-05-15 PROCEDURE — ? POINTED OUT BY PATIENT

## 2025-05-15 PROCEDURE — ? PRESCRIPTION

## 2025-05-15 RX ORDER — KETOCONAZOLE 20 MG/G
CREAM TOPICAL
Qty: 60 | Refills: 3 | Status: ERX | COMMUNITY
Start: 2025-05-15

## 2025-05-15 RX ORDER — AZELAIC ACID 0.15 G/G
GEL TOPICAL BID
Qty: 50 | Refills: 3 | Status: ERX

## 2025-05-15 RX ADMIN — KETOCONAZOLE: 20 CREAM TOPICAL at 00:00

## 2025-05-15 ASSESSMENT — LOCATION SIMPLE DESCRIPTION DERM
LOCATION SIMPLE: RIGHT FOREARM
LOCATION SIMPLE: LEFT FOREARM
LOCATION SIMPLE: LEFT PRETIBIAL REGION
LOCATION SIMPLE: RIGHT PRETIBIAL REGION
LOCATION SIMPLE: RIGHT UPPER ARM
LOCATION SIMPLE: RIGHT FOOT
LOCATION SIMPLE: LEFT CHEEK
LOCATION SIMPLE: LEFT THIGH
LOCATION SIMPLE: RIGHT CHEEK
LOCATION SIMPLE: GROIN
LOCATION SIMPLE: RIGHT THIGH
LOCATION SIMPLE: LEFT FOOT
LOCATION SIMPLE: LEFT UPPER ARM
LOCATION SIMPLE: CHEST
LOCATION SIMPLE: RIGHT LOWER BACK
LOCATION SIMPLE: SCALP
LOCATION SIMPLE: ABDOMEN
LOCATION SIMPLE: LEFT BREAST

## 2025-05-15 ASSESSMENT — LOCATION DETAILED DESCRIPTION DERM
LOCATION DETAILED: RIGHT ANTERIOR PROXIMAL UPPER ARM
LOCATION DETAILED: LEFT ANTERIOR PROXIMAL UPPER ARM
LOCATION DETAILED: LEFT CENTRAL PARIETAL SCALP
LOCATION DETAILED: LEFT ANTERIOR PROXIMAL THIGH
LOCATION DETAILED: RIGHT CENTRAL MALAR CHEEK
LOCATION DETAILED: LEFT INFERIOR MEDIAL MALAR CHEEK
LOCATION DETAILED: RIGHT ANTERIOR DISTAL THIGH
LOCATION DETAILED: RIGHT SUPRAPUBIC SKIN
LOCATION DETAILED: RIGHT ANTERIOR PROXIMAL THIGH
LOCATION DETAILED: 1ST WEBSPACE RIGHT FOOT
LOCATION DETAILED: RIGHT VENTRAL PROXIMAL FOREARM
LOCATION DETAILED: 1ST WEBSPACE LEFT FOOT
LOCATION DETAILED: LEFT INFERIOR CENTRAL MALAR CHEEK
LOCATION DETAILED: LEFT PROXIMAL PRETIBIAL REGION
LOCATION DETAILED: RIGHT SUPERIOR MEDIAL MIDBACK
LOCATION DETAILED: LEFT MEDIAL BREAST 6-7:00 REGION
LOCATION DETAILED: RIGHT LATERAL ABDOMEN
LOCATION DETAILED: EPIGASTRIC SKIN
LOCATION DETAILED: RIGHT MEDIAL SUPERIOR CHEST
LOCATION DETAILED: LEFT ANTERIOR DISTAL UPPER ARM
LOCATION DETAILED: LEFT ANTERIOR LATERAL DISTAL THIGH
LOCATION DETAILED: RIGHT ANTERIOR DISTAL UPPER ARM
LOCATION DETAILED: LEFT VENTRAL PROXIMAL FOREARM
LOCATION DETAILED: RIGHT PROXIMAL PRETIBIAL REGION

## 2025-05-15 ASSESSMENT — LOCATION ZONE DERM
LOCATION ZONE: LEG
LOCATION ZONE: SCALP
LOCATION ZONE: FEET
LOCATION ZONE: ARM
LOCATION ZONE: FACE
LOCATION ZONE: TRUNK

## 2025-05-15 NOTE — PROCEDURE: PRESCRIPTION MEDICATION MANAGEMENT
Continue Regimen: Azelaic Acid 15% gel- to face qd
Detail Level: Simple
Render In Strict Bullet Format?: No
Initiate Treatment: Ketoconazole 2% apply to affected area BID for 3 weeks
Initiate Treatment: Ketoconazole 2% cream apply to feet BID for 3 weeks, then prn flares

## 2025-05-15 NOTE — PROCEDURE: DIAGNOSIS COMMENT
Detail Level: Simple
Comment: [employer] Brochure given.
Render Risk Assessment In Note?: no
Comment: Recurrent

## 2025-06-25 ENCOUNTER — FOLLOW UP (OUTPATIENT)
Dept: URBAN - METROPOLITAN AREA CLINIC 74 | Facility: CLINIC | Age: 60
End: 2025-06-25

## 2025-06-25 DIAGNOSIS — H35.412: ICD-10-CM

## 2025-06-25 DIAGNOSIS — H35.62: ICD-10-CM

## 2025-06-25 DIAGNOSIS — H43.813: ICD-10-CM

## 2025-06-25 DIAGNOSIS — H34.8310: ICD-10-CM

## 2025-06-25 DIAGNOSIS — H33.321: ICD-10-CM

## 2025-06-25 PROCEDURE — 92014 COMPRE OPH EXAM EST PT 1/>: CPT | Mod: 25

## 2025-06-25 PROCEDURE — PFS EYLEA PFS: Mod: JZ

## 2025-06-25 PROCEDURE — 67028 INJECTION EYE DRUG: CPT

## 2025-06-25 PROCEDURE — 92134 CPTRZ OPH DX IMG PST SGM RTA: CPT

## 2025-06-25 PROCEDURE — 92202 OPSCPY EXTND ON/MAC DRAW: CPT | Mod: 59

## 2025-06-25 ASSESSMENT — VISUAL ACUITY
OS_CC: 20/20-2
OD_CC: 20/50-1

## 2025-06-25 ASSESSMENT — TONOMETRY
OS_IOP_MMHG: 9
OD_IOP_MMHG: 9

## 2025-08-27 ENCOUNTER — FOLLOW UP (OUTPATIENT)
Dept: URBAN - METROPOLITAN AREA CLINIC 74 | Facility: CLINIC | Age: 60
End: 2025-08-27

## 2025-08-27 DIAGNOSIS — H43.813: ICD-10-CM

## 2025-08-27 DIAGNOSIS — H35.62: ICD-10-CM

## 2025-08-27 DIAGNOSIS — H33.321: ICD-10-CM

## 2025-08-27 DIAGNOSIS — H35.412: ICD-10-CM

## 2025-08-27 DIAGNOSIS — H34.8310: ICD-10-CM

## 2025-08-27 PROCEDURE — PFS EYLEA PFS: Mod: JZ

## 2025-08-27 PROCEDURE — 92014 COMPRE OPH EXAM EST PT 1/>: CPT | Mod: 25

## 2025-08-27 PROCEDURE — 92202 OPSCPY EXTND ON/MAC DRAW: CPT | Mod: 59

## 2025-08-27 PROCEDURE — 67028 INJECTION EYE DRUG: CPT

## 2025-08-27 PROCEDURE — 92134 CPTRZ OPH DX IMG PST SGM RTA: CPT

## 2025-08-27 ASSESSMENT — VISUAL ACUITY
OD_CC: 20/60
OS_CC: 20/20

## 2025-08-27 ASSESSMENT — TONOMETRY
OD_IOP_MMHG: 14
OS_IOP_MMHG: 12